# Patient Record
Sex: FEMALE | Race: WHITE
[De-identification: names, ages, dates, MRNs, and addresses within clinical notes are randomized per-mention and may not be internally consistent; named-entity substitution may affect disease eponyms.]

---

## 2017-02-27 ENCOUNTER — APPOINTMENT (OUTPATIENT)
Dept: PHARMACY | Facility: CLINIC | Age: 67
End: 2017-02-27

## 2017-05-15 ENCOUNTER — APPOINTMENT (OUTPATIENT)
Dept: OTOLARYNGOLOGY | Facility: CLINIC | Age: 67
End: 2017-05-15

## 2017-05-15 VITALS
DIASTOLIC BLOOD PRESSURE: 88 MMHG | SYSTOLIC BLOOD PRESSURE: 169 MMHG | BODY MASS INDEX: 28.32 KG/M2 | WEIGHT: 150 LBS | HEART RATE: 74 BPM | HEIGHT: 61 IN

## 2017-05-23 ENCOUNTER — APPOINTMENT (OUTPATIENT)
Dept: PHARMACY | Facility: CLINIC | Age: 67
End: 2017-05-23

## 2017-06-29 ENCOUNTER — APPOINTMENT (OUTPATIENT)
Dept: PHARMACY | Facility: CLINIC | Age: 67
End: 2017-06-29

## 2017-08-24 ENCOUNTER — APPOINTMENT (OUTPATIENT)
Dept: PHARMACY | Facility: CLINIC | Age: 67
End: 2017-08-24
Payer: SELF-PAY

## 2017-08-24 PROCEDURE — V5264D: CUSTOM | Mod: LT

## 2017-09-08 ENCOUNTER — APPOINTMENT (OUTPATIENT)
Dept: PHARMACY | Facility: CLINIC | Age: 67
End: 2017-09-08
Payer: MEDICARE

## 2017-09-08 PROCEDURE — V5299A: CUSTOM | Mod: NC

## 2017-12-26 ENCOUNTER — MEDICATION RENEWAL (OUTPATIENT)
Age: 67
End: 2017-12-26

## 2017-12-26 ENCOUNTER — APPOINTMENT (OUTPATIENT)
Dept: PHARMACY | Facility: CLINIC | Age: 67
End: 2017-12-26
Payer: SELF-PAY

## 2017-12-26 PROCEDURE — V5299A: CUSTOM | Mod: NC

## 2018-01-02 ENCOUNTER — APPOINTMENT (OUTPATIENT)
Dept: PHARMACY | Facility: CLINIC | Age: 68
End: 2018-01-02
Payer: SELF-PAY

## 2018-01-02 PROCEDURE — V5267D: CUSTOM

## 2018-01-02 PROCEDURE — V5014C: CUSTOM

## 2018-04-09 ENCOUNTER — APPOINTMENT (OUTPATIENT)
Dept: PHARMACY | Facility: CLINIC | Age: 68
End: 2018-04-09
Payer: SELF-PAY

## 2018-04-09 PROCEDURE — V5014B: CUSTOM

## 2018-05-07 ENCOUNTER — APPOINTMENT (OUTPATIENT)
Dept: OTOLARYNGOLOGY | Facility: CLINIC | Age: 68
End: 2018-05-07
Payer: MEDICARE

## 2018-05-07 VITALS
BODY MASS INDEX: 28.32 KG/M2 | SYSTOLIC BLOOD PRESSURE: 165 MMHG | HEIGHT: 61 IN | HEART RATE: 81 BPM | DIASTOLIC BLOOD PRESSURE: 89 MMHG | WEIGHT: 150 LBS

## 2018-05-07 PROCEDURE — 99213 OFFICE O/P EST LOW 20 MIN: CPT

## 2018-05-07 PROCEDURE — 92567 TYMPANOMETRY: CPT

## 2018-05-07 PROCEDURE — 92557 COMPREHENSIVE HEARING TEST: CPT

## 2018-05-15 ENCOUNTER — APPOINTMENT (OUTPATIENT)
Dept: PHARMACY | Facility: CLINIC | Age: 68
End: 2018-05-15
Payer: SELF-PAY

## 2018-05-15 PROCEDURE — V5014I: CUSTOM

## 2018-05-21 ENCOUNTER — APPOINTMENT (OUTPATIENT)
Dept: PHARMACY | Facility: CLINIC | Age: 68
End: 2018-05-21
Payer: SELF-PAY

## 2018-05-21 PROCEDURE — V5299A: CUSTOM | Mod: NC

## 2018-12-14 ENCOUNTER — APPOINTMENT (OUTPATIENT)
Dept: PHARMACY | Facility: CLINIC | Age: 68
End: 2018-12-14
Payer: SELF-PAY

## 2018-12-14 PROCEDURE — V5299A: CUSTOM | Mod: NC

## 2018-12-17 ENCOUNTER — APPOINTMENT (OUTPATIENT)
Dept: PHARMACY | Facility: CLINIC | Age: 68
End: 2018-12-17
Payer: SELF-PAY

## 2018-12-17 PROCEDURE — V5264D: CUSTOM | Mod: NC

## 2018-12-26 ENCOUNTER — APPOINTMENT (OUTPATIENT)
Dept: OTOLARYNGOLOGY | Facility: CLINIC | Age: 68
End: 2018-12-26
Payer: MEDICARE

## 2018-12-26 VITALS
WEIGHT: 150 LBS | HEIGHT: 61 IN | DIASTOLIC BLOOD PRESSURE: 92 MMHG | BODY MASS INDEX: 28.32 KG/M2 | SYSTOLIC BLOOD PRESSURE: 183 MMHG

## 2018-12-26 PROCEDURE — 69210 REMOVE IMPACTED EAR WAX UNI: CPT

## 2018-12-26 PROCEDURE — 99213 OFFICE O/P EST LOW 20 MIN: CPT | Mod: 25

## 2019-06-17 ENCOUNTER — APPOINTMENT (OUTPATIENT)
Dept: PHARMACY | Facility: CLINIC | Age: 69
End: 2019-06-17
Payer: SELF-PAY

## 2019-06-17 ENCOUNTER — APPOINTMENT (OUTPATIENT)
Dept: OTOLARYNGOLOGY | Facility: CLINIC | Age: 69
End: 2019-06-17
Payer: MEDICARE

## 2019-06-17 VITALS
SYSTOLIC BLOOD PRESSURE: 167 MMHG | HEART RATE: 74 BPM | WEIGHT: 155 LBS | HEIGHT: 61 IN | DIASTOLIC BLOOD PRESSURE: 91 MMHG | BODY MASS INDEX: 29.27 KG/M2

## 2019-06-17 PROCEDURE — V5014I: CUSTOM

## 2019-06-17 PROCEDURE — 99213 OFFICE O/P EST LOW 20 MIN: CPT | Mod: 25

## 2019-06-17 PROCEDURE — 69210 REMOVE IMPACTED EAR WAX UNI: CPT

## 2019-06-17 PROCEDURE — 92557 COMPREHENSIVE HEARING TEST: CPT

## 2019-06-17 PROCEDURE — 92567 TYMPANOMETRY: CPT

## 2019-06-17 RX ORDER — CETIRIZINE HCL 10 MG
TABLET ORAL
Refills: 0 | Status: ACTIVE | COMMUNITY

## 2019-06-17 NOTE — PHYSICAL EXAM
[Midline] : trachea located in midline position [Normal] : no nystagmus [de-identified] : copious wax removed AU - left slitlike perf with larger monomeric area & deep neomembrane , right TM perf healed. wax removed with alligator AU

## 2019-06-17 NOTE — REASON FOR VISIT
[Subsequent Evaluation] : a subsequent evaluation for [Other: _____] : [unfilled] [FreeTextEntry2] : follow up bilateral hearing loss

## 2019-06-17 NOTE — HISTORY OF PRESENT ILLNESS
[de-identified] : 69 year old female follow up bilateral hearing loss.  Currently wearing bilateral hearing aids.  States even with hearing aids, still not hearing well.  STates not sure if its the hearing aids, or her ears.

## 2019-07-15 ENCOUNTER — APPOINTMENT (OUTPATIENT)
Dept: PHARMACY | Facility: CLINIC | Age: 69
End: 2019-07-15
Payer: SELF-PAY

## 2019-07-15 PROCEDURE — V5010 ASSESSMENT FOR HEARING AID: CPT

## 2019-07-25 ENCOUNTER — APPOINTMENT (OUTPATIENT)
Dept: PHARMACY | Facility: CLINIC | Age: 69
End: 2019-07-25
Payer: SELF-PAY

## 2019-07-25 PROCEDURE — V5261C: CUSTOM

## 2019-08-26 ENCOUNTER — APPOINTMENT (OUTPATIENT)
Dept: PHARMACY | Facility: CLINIC | Age: 69
End: 2019-08-26
Payer: SELF-PAY

## 2019-08-26 PROCEDURE — V5299A: CUSTOM | Mod: NC

## 2019-09-04 ENCOUNTER — APPOINTMENT (OUTPATIENT)
Dept: PHARMACY | Facility: CLINIC | Age: 69
End: 2019-09-04
Payer: SELF-PAY

## 2019-09-04 PROCEDURE — V5299A: CUSTOM | Mod: NC

## 2019-11-15 ENCOUNTER — APPOINTMENT (OUTPATIENT)
Dept: PHARMACY | Facility: CLINIC | Age: 69
End: 2019-11-15
Payer: SELF-PAY

## 2019-11-15 PROCEDURE — V5299A: CUSTOM | Mod: NC

## 2019-11-25 ENCOUNTER — APPOINTMENT (OUTPATIENT)
Dept: PHARMACY | Facility: CLINIC | Age: 69
End: 2019-11-25
Payer: SELF-PAY

## 2019-11-25 PROCEDURE — V5299A: CUSTOM | Mod: NC

## 2020-01-06 ENCOUNTER — APPOINTMENT (OUTPATIENT)
Dept: PHARMACY | Facility: CLINIC | Age: 70
End: 2020-01-06
Payer: SELF-PAY

## 2020-01-06 ENCOUNTER — APPOINTMENT (OUTPATIENT)
Dept: PHARMACY | Facility: CLINIC | Age: 70
End: 2020-01-06

## 2020-01-06 ENCOUNTER — TRANSCRIPTION ENCOUNTER (OUTPATIENT)
Age: 70
End: 2020-01-06

## 2020-01-06 PROCEDURE — V5299A: CUSTOM | Mod: NC

## 2020-04-01 ENCOUNTER — APPOINTMENT (OUTPATIENT)
Dept: OTOLARYNGOLOGY | Facility: CLINIC | Age: 70
End: 2020-04-01

## 2020-08-24 ENCOUNTER — APPOINTMENT (OUTPATIENT)
Dept: PHARMACY | Facility: CLINIC | Age: 70
End: 2020-08-24
Payer: SELF-PAY

## 2020-08-24 ENCOUNTER — APPOINTMENT (OUTPATIENT)
Dept: OTOLARYNGOLOGY | Facility: CLINIC | Age: 70
End: 2020-08-24
Payer: MEDICARE

## 2020-08-24 VITALS
HEIGHT: 61 IN | DIASTOLIC BLOOD PRESSURE: 101 MMHG | SYSTOLIC BLOOD PRESSURE: 159 MMHG | WEIGHT: 160 LBS | BODY MASS INDEX: 30.21 KG/M2 | HEART RATE: 81 BPM

## 2020-08-24 PROCEDURE — 92557 COMPREHENSIVE HEARING TEST: CPT

## 2020-08-24 PROCEDURE — 99213 OFFICE O/P EST LOW 20 MIN: CPT | Mod: 25

## 2020-08-24 PROCEDURE — 69210 REMOVE IMPACTED EAR WAX UNI: CPT

## 2020-08-24 PROCEDURE — 92567 TYMPANOMETRY: CPT

## 2020-08-24 PROCEDURE — V5267D: CUSTOM

## 2020-08-30 NOTE — HISTORY OF PRESENT ILLNESS
[de-identified] : 70 year old female follow up bilateral hearing loss. Currently wearing bilateral hearing aids.  Unsure if hearing has changed from last year or not.  Patient denies otalgia, otorrhea, ear infections, tinnitus, dizziness, vertigo, headaches related to hearing.\par

## 2020-08-30 NOTE — PHYSICAL EXAM
[de-identified] : copious wax removed AU - left slitlike perf with larger monomeric area & deep neomembrane , right TM perf healed. wax removed with alligator AU

## 2020-09-15 ENCOUNTER — APPOINTMENT (OUTPATIENT)
Dept: PHARMACY | Facility: CLINIC | Age: 70
End: 2020-09-15
Payer: SELF-PAY

## 2020-09-15 PROCEDURE — V5299A: CUSTOM | Mod: NC

## 2020-10-19 ENCOUNTER — APPOINTMENT (OUTPATIENT)
Dept: PHARMACY | Facility: CLINIC | Age: 70
End: 2020-10-19
Payer: SELF-PAY

## 2020-10-19 PROCEDURE — V5299A: CUSTOM | Mod: NC

## 2020-11-02 ENCOUNTER — APPOINTMENT (OUTPATIENT)
Dept: PHARMACY | Facility: CLINIC | Age: 70
End: 2020-11-02
Payer: SELF-PAY

## 2020-11-02 PROCEDURE — V5299A: CUSTOM | Mod: NC

## 2021-03-01 ENCOUNTER — APPOINTMENT (OUTPATIENT)
Dept: OTOLARYNGOLOGY | Facility: CLINIC | Age: 71
End: 2021-03-01
Payer: MEDICARE

## 2021-03-01 PROCEDURE — 99212 OFFICE O/P EST SF 10 MIN: CPT | Mod: 25

## 2021-03-01 PROCEDURE — 69210 REMOVE IMPACTED EAR WAX UNI: CPT

## 2021-03-01 RX ORDER — SULFAMETHOXAZOLE AND TRIMETHOPRIM 800; 160 MG/1; MG/1
800-160 TABLET ORAL
Qty: 20 | Refills: 0 | Status: DISCONTINUED | COMMUNITY
Start: 2020-09-12

## 2021-03-01 RX ORDER — FLUOROURACIL 50 MG/G
5 CREAM TOPICAL
Qty: 40 | Refills: 0 | Status: DISCONTINUED | COMMUNITY
Start: 2020-12-09

## 2021-03-01 RX ORDER — FLUCONAZOLE 150 MG/1
150 TABLET ORAL
Qty: 1 | Refills: 0 | Status: DISCONTINUED | COMMUNITY
Start: 2020-09-12

## 2021-03-01 NOTE — HISTORY OF PRESENT ILLNESS
[de-identified] : 71F f/u for mixed hearing loss and left TM perforation - using Hearing Aids consistently- needs wax removed- denies otalgia, otorrhea, ear infections- hearing feels stable.

## 2021-03-01 NOTE — PHYSICAL EXAM
[de-identified] : Obstructing cerumen removed AU using alligator, - tolerated well - TMs normal AD post-removal, AS unchanged dry perf.

## 2021-06-07 ENCOUNTER — APPOINTMENT (OUTPATIENT)
Dept: PHARMACY | Facility: CLINIC | Age: 71
End: 2021-06-07
Payer: SELF-PAY

## 2021-06-07 PROCEDURE — V5267D: CUSTOM

## 2021-06-07 PROCEDURE — V5299A: CUSTOM | Mod: NC

## 2021-06-21 ENCOUNTER — APPOINTMENT (OUTPATIENT)
Dept: PHARMACY | Facility: CLINIC | Age: 71
End: 2021-06-21
Payer: SELF-PAY

## 2021-06-21 PROCEDURE — V5299A: CUSTOM | Mod: NC

## 2021-07-09 ENCOUNTER — APPOINTMENT (OUTPATIENT)
Dept: PHARMACY | Facility: CLINIC | Age: 71
End: 2021-07-09
Payer: SELF-PAY

## 2021-07-09 PROCEDURE — V5299A: CUSTOM | Mod: NC

## 2021-09-13 ENCOUNTER — APPOINTMENT (OUTPATIENT)
Dept: OTOLARYNGOLOGY | Facility: CLINIC | Age: 71
End: 2021-09-13
Payer: MEDICARE

## 2021-09-13 VITALS
BODY MASS INDEX: 28.7 KG/M2 | WEIGHT: 152 LBS | DIASTOLIC BLOOD PRESSURE: 96 MMHG | HEART RATE: 90 BPM | SYSTOLIC BLOOD PRESSURE: 170 MMHG | HEIGHT: 61 IN

## 2021-09-13 DIAGNOSIS — I10 ESSENTIAL (PRIMARY) HYPERTENSION: ICD-10-CM

## 2021-09-13 PROCEDURE — 69210 REMOVE IMPACTED EAR WAX UNI: CPT

## 2021-09-13 PROCEDURE — 99214 OFFICE O/P EST MOD 30 MIN: CPT | Mod: 25

## 2021-09-13 PROCEDURE — 92557 COMPREHENSIVE HEARING TEST: CPT

## 2021-09-13 PROCEDURE — 92567 TYMPANOMETRY: CPT

## 2021-09-13 RX ORDER — ACETAMINOPHEN AND CODEINE PHOSPHATE 300; 15 MG/1; MG/1
300-15 TABLET ORAL
Qty: 10 | Refills: 0 | Status: DISCONTINUED | COMMUNITY
Start: 2021-06-23

## 2021-09-13 NOTE — PHYSICAL EXAM
[Midline] : trachea located in midline position [Normal] : no nystagmus [de-identified] : Obstructing cerumen removed AU using alligator, - tolerated well - TMs normal AU

## 2021-09-13 NOTE — DATA REVIEWED
[de-identified] : Ad: Moderate to severe SNHL. Type A tymp. Excellent WRS.\par As: Moderate to severe mixed HL- SNHL above 2kHz. TYpe B tymp, large ECV. Excellent WRS.

## 2022-03-14 ENCOUNTER — APPOINTMENT (OUTPATIENT)
Dept: OTOLARYNGOLOGY | Facility: CLINIC | Age: 72
End: 2022-03-14
Payer: MEDICARE

## 2022-03-14 VITALS
BODY MASS INDEX: 29.27 KG/M2 | DIASTOLIC BLOOD PRESSURE: 109 MMHG | WEIGHT: 155 LBS | SYSTOLIC BLOOD PRESSURE: 171 MMHG | HEIGHT: 61 IN | HEART RATE: 91 BPM

## 2022-03-14 PROCEDURE — 92567 TYMPANOMETRY: CPT

## 2022-03-14 PROCEDURE — 69210 REMOVE IMPACTED EAR WAX UNI: CPT

## 2022-03-14 PROCEDURE — 92557 COMPREHENSIVE HEARING TEST: CPT

## 2022-03-14 PROCEDURE — 99213 OFFICE O/P EST LOW 20 MIN: CPT | Mod: 25

## 2022-03-14 NOTE — PHYSICAL EXAM
[Midline] : trachea located in midline position [Normal] : no nystagmus [de-identified] : Obstructing cerumen removed AU using alligator, - tolerated well - TMs normal AU - no perforation

## 2022-03-14 NOTE — HISTORY OF PRESENT ILLNESS
[de-identified] : 73 yo F with asymmetric mixed hearing loss presents for follow up. Wears bilaterally hearing aids regularly. No tinnitus, otalgia, otorrhea, ear infections, dizziness or headaches. Did not get PCP.  feels hearing worse - hard time understanding what is ogonig on

## 2022-03-14 NOTE — DATA REVIEWED
[de-identified] : Moderate to moderately severe mixed HL.\par Type A Tymp, RE,a nd Type B Tymp with large ECV, LE.\par

## 2022-03-30 ENCOUNTER — APPOINTMENT (OUTPATIENT)
Dept: PHARMACY | Facility: CLINIC | Age: 72
End: 2022-03-30
Payer: SELF-PAY

## 2022-03-30 PROCEDURE — V5267D: CUSTOM

## 2022-05-24 ENCOUNTER — APPOINTMENT (OUTPATIENT)
Dept: PHARMACY | Facility: CLINIC | Age: 72
End: 2022-05-24
Payer: SELF-PAY

## 2022-05-24 PROCEDURE — V5264E: CUSTOM

## 2022-06-28 ENCOUNTER — APPOINTMENT (OUTPATIENT)
Dept: PHARMACY | Facility: CLINIC | Age: 72
End: 2022-06-28

## 2022-06-28 PROCEDURE — V5299A: CUSTOM | Mod: NC

## 2022-08-22 ENCOUNTER — APPOINTMENT (OUTPATIENT)
Dept: PHARMACY | Facility: CLINIC | Age: 72
End: 2022-08-22

## 2022-08-22 PROCEDURE — V5299A: CUSTOM | Mod: NC

## 2022-08-29 ENCOUNTER — APPOINTMENT (OUTPATIENT)
Dept: OTOLARYNGOLOGY | Facility: CLINIC | Age: 72
End: 2022-08-29

## 2022-08-29 ENCOUNTER — APPOINTMENT (OUTPATIENT)
Dept: PHARMACY | Facility: CLINIC | Age: 72
End: 2022-08-29

## 2022-08-29 PROCEDURE — G0268 REMOVAL OF IMPACTED WAX MD: CPT

## 2022-08-29 PROCEDURE — 99212 OFFICE O/P EST SF 10 MIN: CPT | Mod: 25

## 2022-08-29 PROCEDURE — V5299A: CUSTOM | Mod: NC

## 2022-08-30 ENCOUNTER — NON-APPOINTMENT (OUTPATIENT)
Age: 72
End: 2022-08-30

## 2022-09-12 ENCOUNTER — APPOINTMENT (OUTPATIENT)
Dept: PHARMACY | Facility: CLINIC | Age: 72
End: 2022-09-12

## 2022-09-12 PROCEDURE — V5299A: CUSTOM | Mod: NC

## 2022-09-23 NOTE — PHYSICAL EXAM
[Midline] : trachea located in midline position [Normal] : no nystagmus [de-identified] : Obstructing cerumen removed AU using alligator, - tolerated well - TMs normal AU - no perforation

## 2022-09-28 ENCOUNTER — APPOINTMENT (OUTPATIENT)
Dept: PHARMACY | Facility: CLINIC | Age: 72
End: 2022-09-28

## 2022-09-28 PROCEDURE — V5299A: CUSTOM | Mod: NC

## 2022-10-03 ENCOUNTER — APPOINTMENT (OUTPATIENT)
Dept: PHARMACY | Facility: CLINIC | Age: 72
End: 2022-10-03

## 2022-10-28 ENCOUNTER — NON-APPOINTMENT (OUTPATIENT)
Age: 72
End: 2022-10-28

## 2022-12-08 ENCOUNTER — APPOINTMENT (OUTPATIENT)
Dept: PHARMACY | Facility: CLINIC | Age: 72
End: 2022-12-08

## 2022-12-08 PROCEDURE — V5267D: CUSTOM

## 2022-12-08 NOTE — PROCEDURE
[de-identified] : Earmold dispensed. Patient reported that it had a good fit. SN [id number]. Cleaned and checked both hearing aids. Patient happy with services today. Purchased 3 packs of wax guards.

## 2022-12-08 NOTE — HISTORY OF PRESENT ILLNESS
[FreeTextEntry1] : 72 year old female, with bilateral sensorineural hearing loss.  Currently uses [employer]wave Evoke 330 Fusion hearing aids   [FreeTextEntry8] : Patient seen today for dispensing of remade right earmold.

## 2022-12-08 NOTE — PLAN
[FreeTextEntry2] : 1. Continued use of amplification \par 2. HAC in 6 months or sooner as needed\par 3. Annual audio or sooner as needed

## 2022-12-27 ENCOUNTER — NON-APPOINTMENT (OUTPATIENT)
Age: 72
End: 2022-12-27

## 2023-01-11 ENCOUNTER — APPOINTMENT (OUTPATIENT)
Dept: INFECTIOUS DISEASE | Facility: CLINIC | Age: 73
End: 2023-01-11
Payer: SELF-PAY

## 2023-01-11 DIAGNOSIS — Z71.84 ENC FOR HEALTH COUNSELING RELATED TO TRAVEL: ICD-10-CM

## 2023-01-11 PROCEDURE — 90691 TYPHOID VACCINE IM: CPT

## 2023-01-11 PROCEDURE — 90471 IMMUNIZATION ADMIN: CPT | Mod: NC

## 2023-01-11 PROCEDURE — 99401 PREV MED CNSL INDIV APPRX 15: CPT | Mod: 25

## 2023-01-20 ENCOUNTER — OFFICE (OUTPATIENT)
Dept: URBAN - METROPOLITAN AREA CLINIC 90 | Facility: CLINIC | Age: 73
Setting detail: OPHTHALMOLOGY
End: 2023-01-20
Payer: MEDICARE

## 2023-01-20 DIAGNOSIS — H43.391: ICD-10-CM

## 2023-01-20 DIAGNOSIS — H25.13: ICD-10-CM

## 2023-01-20 DIAGNOSIS — H57.02: ICD-10-CM

## 2023-01-20 DIAGNOSIS — H02.834: ICD-10-CM

## 2023-01-20 DIAGNOSIS — H16.223: ICD-10-CM

## 2023-01-20 DIAGNOSIS — H02.831: ICD-10-CM

## 2023-01-20 DIAGNOSIS — H35.432: ICD-10-CM

## 2023-01-20 DIAGNOSIS — H40.013: ICD-10-CM

## 2023-01-20 DIAGNOSIS — H47.021: ICD-10-CM

## 2023-01-20 DIAGNOSIS — H02.401: ICD-10-CM

## 2023-01-20 PROCEDURE — 92250 FUNDUS PHOTOGRAPHY W/I&R: CPT | Performed by: OPHTHALMOLOGY

## 2023-01-20 PROCEDURE — 92014 COMPRE OPH EXAM EST PT 1/>: CPT | Performed by: OPHTHALMOLOGY

## 2023-01-20 PROCEDURE — 92083 EXTENDED VISUAL FIELD XM: CPT | Performed by: OPHTHALMOLOGY

## 2023-01-20 ASSESSMENT — LID EXAM ASSESSMENTS
OD_MEIBOMITIS: RLL 1+
OS_BLEPHARITIS: 1+
OD_BLEPHARITIS: 1+

## 2023-01-20 ASSESSMENT — REFRACTION_MANIFEST
OD_AXIS: 070
OS_VA1: 20/30+2
OD_CYLINDER: -1.25
OD_CYLINDER: -0.50
OS_CYLINDER: -1.00
OS_ADD: +2.50
OD_ADD: +2.25
OS_VA1: 20/20
OD_SPHERE: -0.75
OS_VA2: 20/20(J1+)
OS_SPHERE: -0.75
OD_VA2: 20/20
OD_VA1: 20/30-1
OD_VA2: 20/20(J1+)
OS_AXIS: 070
OD_AXIS: 090
OS_SPHERE: -0.50
OD_VA1: 20/30
OS_VA2: 20/20
OS_ADD: +2.25
OS_SPHERE: -0.50
OD_SPHERE: -1.00
OD_VA1: 20/25+2
OS_CYLINDER: -1.00
OD_SPHERE: -0.50
OD_ADD: +2.50
OS_AXIS: 060
OS_CYLINDER: -0.75
OD_AXIS: 075
OS_VA1: 20/25+2
OD_CYLINDER: -1.25
OS_AXIS: 070

## 2023-01-20 ASSESSMENT — REFRACTION_CURRENTRX
OD_VPRISM_DIRECTION: SV
OS_AXIS: 075
OS_SPHERE: -0.50
OD_SPHERE: -0.75
OS_SPHERE: -2.00
OS_SPHERE: -0.50
OD_SPHERE: -0.75
OD_AXIS: 92
OS_CYLINDER: -1.00
OS_VPRISM_DIRECTION: SV
OS_CYLINDER: -0.75
OS_OVR_VA: 20/
OD_CYLINDER: -1.00
OS_VPRISM_DIRECTION: SV
OD_SPHERE: +2.00
OD_VPRISM_DIRECTION: SV
OD_CYLINDER: -1.00
OD_AXIS: 080
OD_AXIS: 093
OS_OVR_VA: 20/
OD_CYLINDER: -1.25
OD_VPRISM_DIRECTION: SV
OS_OVR_VA: 20/
OD_OVR_VA: 20/
OS_VPRISM_DIRECTION: SV
OS_CYLINDER: -0.75
OS_AXIS: 070
OS_AXIS: 63
OD_OVR_VA: 20/
OD_OVR_VA: 20/

## 2023-01-20 ASSESSMENT — SPHEQUIV_DERIVED
OD_SPHEQUIV: -1.125
OS_SPHEQUIV: -1
OS_SPHEQUIV: -1.125
OD_SPHEQUIV: -2.25
OS_SPHEQUIV: -1.25
OS_SPHEQUIV: -1
OD_SPHEQUIV: -1.25
OD_SPHEQUIV: -1.375

## 2023-01-20 ASSESSMENT — LID POSITION - COMMENTS
OD_COMMENTS: TEMPORAL HOODING
OS_COMMENTS: TEMPORAL HOODING

## 2023-01-20 ASSESSMENT — REFRACTION_AUTOREFRACTION
OD_CYLINDER: -0.50
OD_AXIS: 086
OS_CYLINDER: -1.00
OS_AXIS: 061
OS_SPHERE: -0.75
OD_SPHERE: -2.00

## 2023-01-20 ASSESSMENT — AXIALLENGTH_DERIVED
OS_AL: 23.5683
OD_AL: 23.2993
OS_AL: 23.6659
OS_AL: 23.5683
OD_AL: 23.3469
OD_AL: 23.3947
OD_AL: 23.7348
OS_AL: 23.617

## 2023-01-20 ASSESSMENT — CONFRONTATIONAL VISUAL FIELD TEST (CVF)
OD_FINDINGS: FULL
OS_FINDINGS: FULL

## 2023-01-20 ASSESSMENT — VISUAL ACUITY
OD_BCVA: 20/25+2
OS_BCVA: 20/30+2

## 2023-01-20 ASSESSMENT — TONOMETRY
OS_IOP_MMHG: 14
OD_IOP_MMHG: 14

## 2023-01-20 ASSESSMENT — LID POSITION - PTOSIS: OD_PTOSIS: RUL 1+

## 2023-01-20 ASSESSMENT — SUPERFICIAL PUNCTATE KERATITIS (SPK)
OD_SPK: ABSENT
OS_SPK: ABSENT

## 2023-01-20 ASSESSMENT — KERATOMETRY
OD_K1POWER_DIOPTERS: 45.25
OS_K1POWER_DIOPTERS: 44.25
OD_AXISANGLE_DEGREES: 134
OD_K2POWER_DIOPTERS: 45.75
METHOD_AUTO_MANUAL: AUTO
OS_AXISANGLE_DEGREES: 127
OS_K2POWER_DIOPTERS: 45.00

## 2023-01-20 ASSESSMENT — LID POSITION - DERMATOCHALASIS
OS_DERMATOCHALASIS: LUL 1+
OD_DERMATOCHALASIS: RUL 1+

## 2023-02-27 ENCOUNTER — APPOINTMENT (OUTPATIENT)
Dept: PHARMACY | Facility: CLINIC | Age: 73
End: 2023-02-27
Payer: SELF-PAY

## 2023-02-27 PROCEDURE — V5014D: CUSTOM | Mod: RT

## 2023-03-01 ENCOUNTER — APPOINTMENT (OUTPATIENT)
Dept: PHARMACY | Facility: CLINIC | Age: 73
End: 2023-03-01
Payer: SELF-PAY

## 2023-03-01 PROCEDURE — V5299A: CUSTOM | Mod: NC

## 2023-04-17 ENCOUNTER — APPOINTMENT (OUTPATIENT)
Dept: PHARMACY | Facility: CLINIC | Age: 73
End: 2023-04-17
Payer: SELF-PAY

## 2023-04-17 ENCOUNTER — APPOINTMENT (OUTPATIENT)
Dept: OTOLARYNGOLOGY | Facility: CLINIC | Age: 73
End: 2023-04-17
Payer: MEDICARE

## 2023-04-17 VITALS
BODY MASS INDEX: 30.21 KG/M2 | DIASTOLIC BLOOD PRESSURE: 96 MMHG | WEIGHT: 160 LBS | HEIGHT: 61 IN | HEART RATE: 86 BPM | SYSTOLIC BLOOD PRESSURE: 172 MMHG

## 2023-04-17 PROCEDURE — V5014I: CUSTOM

## 2023-04-17 PROCEDURE — 99213 OFFICE O/P EST LOW 20 MIN: CPT | Mod: 25

## 2023-04-17 PROCEDURE — 69210 REMOVE IMPACTED EAR WAX UNI: CPT

## 2023-05-11 ENCOUNTER — OFFICE (OUTPATIENT)
Dept: URBAN - METROPOLITAN AREA CLINIC 90 | Facility: CLINIC | Age: 73
Setting detail: OPHTHALMOLOGY
End: 2023-05-11
Payer: MEDICARE

## 2023-05-11 DIAGNOSIS — H02.834: ICD-10-CM

## 2023-05-11 DIAGNOSIS — H43.391: ICD-10-CM

## 2023-05-11 DIAGNOSIS — H25.13: ICD-10-CM

## 2023-05-11 DIAGNOSIS — H40.013: ICD-10-CM

## 2023-05-11 DIAGNOSIS — H57.02: ICD-10-CM

## 2023-05-11 DIAGNOSIS — H02.831: ICD-10-CM

## 2023-05-11 DIAGNOSIS — H02.401: ICD-10-CM

## 2023-05-11 DIAGNOSIS — H35.432: ICD-10-CM

## 2023-05-11 DIAGNOSIS — H16.223: ICD-10-CM

## 2023-05-11 PROCEDURE — 92014 COMPRE OPH EXAM EST PT 1/>: CPT | Performed by: OPHTHALMOLOGY

## 2023-05-11 ASSESSMENT — AXIALLENGTH_DERIVED
OS_AL: 23.5683
OD_AL: 23.2993
OD_AL: 23.3947
OS_AL: 23.617
OD_AL: 23.7348
OS_AL: 23.6659
OD_AL: 23.3469
OS_AL: 23.5683

## 2023-05-11 ASSESSMENT — REFRACTION_MANIFEST
OD_AXIS: 070
OD_ADD: +2.50
OS_VA1: 20/25+2
OS_CYLINDER: -1.00
OS_ADD: +2.50
OS_AXIS: 070
OD_CYLINDER: -1.25
OD_SPHERE: -1.00
OS_VA1: 20/20
OS_CYLINDER: -0.75
OD_SPHERE: -0.75
OS_AXIS: 070
OS_SPHERE: -0.50
OS_SPHERE: -0.75
OS_ADD: +2.25
OS_CYLINDER: -1.00
OD_VA1: 20/25+2
OS_VA2: 20/20
OD_VA1: 20/30
OD_ADD: +2.25
OD_VA1: 20/30-1
OS_VA1: 20/30+2
OS_VA2: 20/20(J1+)
OS_SPHERE: -0.50
OD_AXIS: 075
OS_AXIS: 060
OD_VA2: 20/20(J1+)
OD_SPHERE: -0.50
OD_VA2: 20/20
OD_AXIS: 090
OD_CYLINDER: -0.50
OD_CYLINDER: -1.25

## 2023-05-11 ASSESSMENT — REFRACTION_CURRENTRX
OS_OVR_VA: 20/
OD_VPRISM_DIRECTION: SV
OD_OVR_VA: 20/
OS_SPHERE: -2.00
OD_AXIS: 080
OS_OVR_VA: 20/
OS_VPRISM_DIRECTION: SV
OD_AXIS: 093
OD_SPHERE: -0.75
OS_OVR_VA: 20/
OD_CYLINDER: -1.25
OD_CYLINDER: -1.00
OD_VPRISM_DIRECTION: SV
OS_VPRISM_DIRECTION: SV
OS_SPHERE: -0.50
OS_AXIS: 63
OD_SPHERE: -0.75
OS_CYLINDER: -1.00
OS_VPRISM_DIRECTION: SV
OS_AXIS: 070
OS_AXIS: 075
OS_CYLINDER: -0.75
OD_OVR_VA: 20/
OD_OVR_VA: 20/
OD_CYLINDER: -1.00
OD_VPRISM_DIRECTION: SV
OD_AXIS: 92
OD_SPHERE: +2.00
OS_SPHERE: -0.50
OS_CYLINDER: -0.75

## 2023-05-11 ASSESSMENT — REFRACTION_AUTOREFRACTION
OD_CYLINDER: -0.50
OD_SPHERE: -2.00
OS_SPHERE: -0.75
OD_AXIS: 086
OS_CYLINDER: -1.00
OS_AXIS: 061

## 2023-05-11 ASSESSMENT — LID POSITION - COMMENTS
OD_COMMENTS: TEMPORAL HOODING
OS_COMMENTS: TEMPORAL HOODING

## 2023-05-11 ASSESSMENT — SPHEQUIV_DERIVED
OD_SPHEQUIV: -1.25
OS_SPHEQUIV: -1
OD_SPHEQUIV: -1.125
OD_SPHEQUIV: -2.25
OS_SPHEQUIV: -1
OS_SPHEQUIV: -1.25
OS_SPHEQUIV: -1.125
OD_SPHEQUIV: -1.375

## 2023-05-11 ASSESSMENT — LID POSITION - PTOSIS: OD_PTOSIS: RUL 1+

## 2023-05-11 ASSESSMENT — VISUAL ACUITY
OS_BCVA: 20/40+2
OD_BCVA: 20/30+2

## 2023-05-11 ASSESSMENT — SUPERFICIAL PUNCTATE KERATITIS (SPK)
OS_SPK: ABSENT
OD_SPK: ABSENT

## 2023-05-11 ASSESSMENT — CONFRONTATIONAL VISUAL FIELD TEST (CVF)
OS_FINDINGS: FULL
OD_FINDINGS: FULL

## 2023-05-11 ASSESSMENT — LID EXAM ASSESSMENTS
OD_BLEPHARITIS: 1+
OD_MEIBOMITIS: RLL 1+
OS_BLEPHARITIS: 1+

## 2023-05-11 ASSESSMENT — LID POSITION - DERMATOCHALASIS
OD_DERMATOCHALASIS: RUL 1+
OS_DERMATOCHALASIS: LUL 1+

## 2023-05-11 ASSESSMENT — KERATOMETRY
OD_K2POWER_DIOPTERS: 45.75
OS_K1POWER_DIOPTERS: 44.25
OD_K1POWER_DIOPTERS: 45.25
OS_K2POWER_DIOPTERS: 45.00
OS_AXISANGLE_DEGREES: 127
METHOD_AUTO_MANUAL: AUTO
OD_AXISANGLE_DEGREES: 134

## 2023-05-11 ASSESSMENT — TONOMETRY
OS_IOP_MMHG: 14
OD_IOP_MMHG: 14

## 2023-05-24 NOTE — PHYSICAL EXAM
[Normal] : normal appearance, well groomed, well nourished, and in no acute distress [de-identified] : Obstructing cerumen removed AS using curette and alligator, - tolerated well - TMs normal AU - no perforation

## 2023-05-24 NOTE — HISTORY OF PRESENT ILLNESS
[de-identified] : 74 yo F referred by audiologist for cerumen impaction in left ear. No noticeable change in hearing. No tinnitus, otalgia, otorrhea, ear infections, dizziness or headaches.

## 2023-09-12 RX ORDER — FLUTICASONE PROPIONATE 50 UG/1
50 SPRAY, METERED NASAL
Qty: 16 | Refills: 3 | Status: ACTIVE | COMMUNITY
Start: 2023-09-12 | End: 1900-01-01

## 2023-09-15 DIAGNOSIS — H92.10 OTORRHEA, UNSPECIFIED EAR: ICD-10-CM

## 2023-09-15 RX ORDER — CIPROFLOXACIN AND DEXAMETHASONE 3; 1 MG/ML; MG/ML
0.3-0.1 SUSPENSION/ DROPS AURICULAR (OTIC)
Qty: 1 | Refills: 1 | Status: ACTIVE | COMMUNITY
Start: 2023-09-15 | End: 1900-01-01

## 2023-09-20 ENCOUNTER — APPOINTMENT (OUTPATIENT)
Dept: OTOLARYNGOLOGY | Facility: CLINIC | Age: 73
End: 2023-09-20
Payer: MEDICARE

## 2023-09-20 VITALS
BODY MASS INDEX: 29.45 KG/M2 | DIASTOLIC BLOOD PRESSURE: 85 MMHG | HEIGHT: 61 IN | SYSTOLIC BLOOD PRESSURE: 145 MMHG | WEIGHT: 156 LBS | HEART RATE: 105 BPM

## 2023-09-20 DIAGNOSIS — H90.6 MIXED CONDUCTIVE AND SENSORINEURAL HEARING LOSS, BILATERAL: ICD-10-CM

## 2023-09-20 DIAGNOSIS — H69.80 OTHER SPECIFIED DISORDERS OF EUSTACHIAN TUBE, UNSPECIFIED EAR: ICD-10-CM

## 2023-09-20 DIAGNOSIS — H61.22 IMPACTED CERUMEN, LEFT EAR: ICD-10-CM

## 2023-09-20 PROCEDURE — 69210 REMOVE IMPACTED EAR WAX UNI: CPT

## 2023-09-20 PROCEDURE — 99213 OFFICE O/P EST LOW 20 MIN: CPT | Mod: 25

## 2023-09-20 PROCEDURE — 92567 TYMPANOMETRY: CPT

## 2023-09-20 PROCEDURE — 92557 COMPREHENSIVE HEARING TEST: CPT

## 2023-11-29 ENCOUNTER — APPOINTMENT (OUTPATIENT)
Dept: PHARMACY | Facility: CLINIC | Age: 73
End: 2023-11-29
Payer: SELF-PAY

## 2023-11-29 PROCEDURE — V5267D: CUSTOM

## 2023-11-29 PROCEDURE — V5299J: CUSTOM

## 2023-12-28 ENCOUNTER — OFFICE (OUTPATIENT)
Dept: URBAN - METROPOLITAN AREA CLINIC 90 | Facility: CLINIC | Age: 73
Setting detail: OPHTHALMOLOGY
End: 2023-12-28
Payer: MEDICARE

## 2023-12-28 DIAGNOSIS — H02.831: ICD-10-CM

## 2023-12-28 DIAGNOSIS — H02.834: ICD-10-CM

## 2023-12-28 DIAGNOSIS — H25.13: ICD-10-CM

## 2023-12-28 DIAGNOSIS — H43.813: ICD-10-CM

## 2023-12-28 DIAGNOSIS — H57.02: ICD-10-CM

## 2023-12-28 DIAGNOSIS — H16.223: ICD-10-CM

## 2023-12-28 DIAGNOSIS — H43.391: ICD-10-CM

## 2023-12-28 DIAGNOSIS — H02.401: ICD-10-CM

## 2023-12-28 DIAGNOSIS — H40.013: ICD-10-CM

## 2023-12-28 DIAGNOSIS — H35.432: ICD-10-CM

## 2023-12-28 DIAGNOSIS — H35.412: ICD-10-CM

## 2023-12-28 PROCEDURE — 92250 FUNDUS PHOTOGRAPHY W/I&R: CPT | Performed by: OPHTHALMOLOGY

## 2023-12-28 PROCEDURE — 92083 EXTENDED VISUAL FIELD XM: CPT | Performed by: OPHTHALMOLOGY

## 2023-12-28 PROCEDURE — 92014 COMPRE OPH EXAM EST PT 1/>: CPT | Performed by: OPHTHALMOLOGY

## 2023-12-28 ASSESSMENT — SPHEQUIV_DERIVED
OS_SPHEQUIV: -1.125
OD_SPHEQUIV: -1.125
OD_SPHEQUIV: -2.875
OS_SPHEQUIV: -1.25
OS_SPHEQUIV: -1
OD_SPHEQUIV: -1.25
OD_SPHEQUIV: -1.375
OS_SPHEQUIV: -1

## 2023-12-28 ASSESSMENT — LID EXAM ASSESSMENTS
OS_BLEPHARITIS: 1+
OD_BLEPHARITIS: 1+
OD_MEIBOMITIS: RLL 1+

## 2023-12-28 ASSESSMENT — REFRACTION_AUTOREFRACTION
OD_SPHERE: -2.75
OS_SPHERE: -0.50
OS_CYLINDER: -1.50
OD_AXIS: 114
OD_CYLINDER: -0.25
OS_AXIS: 071

## 2023-12-28 ASSESSMENT — REFRACTION_CURRENTRX
OS_VPRISM_DIRECTION: SV
OS_CYLINDER: -0.75
OS_OVR_VA: 20/
OD_SPHERE: +2.00
OD_OVR_VA: 20/
OS_OVR_VA: 20/
OS_CYLINDER: -0.75
OS_SPHERE: -2.00
OS_OVR_VA: 20/
OS_VPRISM_DIRECTION: SV
OS_SPHERE: -0.50
OD_SPHERE: -0.75
OD_OVR_VA: 20/
OD_CYLINDER: -1.00
OD_CYLINDER: -1.25
OD_CYLINDER: -1.00
OS_SPHERE: -0.50
OD_VPRISM_DIRECTION: SV
OS_AXIS: 63
OD_AXIS: 92
OD_VPRISM_DIRECTION: SV
OS_VPRISM_DIRECTION: SV
OD_VPRISM_DIRECTION: SV
OS_CYLINDER: -1.00
OD_AXIS: 080
OD_AXIS: 093
OS_AXIS: 070
OD_SPHERE: -0.75
OD_OVR_VA: 20/
OS_AXIS: 075

## 2023-12-28 ASSESSMENT — REFRACTION_MANIFEST
OS_CYLINDER: -1.00
OD_AXIS: 070
OS_AXIS: 070
OD_VA1: 20/30-1
OS_VA2: 20/20(J1+)
OD_VA1: 20/25+2
OD_AXIS: 075
OS_ADD: +2.50
OD_SPHERE: -0.50
OD_CYLINDER: -1.25
OS_SPHERE: -0.50
OD_VA2: 20/20(J1+)
OS_SPHERE: -0.50
OS_ADD: +2.25
OD_ADD: +2.50
OD_ADD: +2.25
OS_VA1: 20/25+2
OD_SPHERE: -0.75
OS_SPHERE: -0.75
OD_SPHERE: -1.00
OS_AXIS: 070
OS_AXIS: 060
OS_VA2: 20/20
OD_AXIS: 090
OD_CYLINDER: -1.25
OS_VA1: 20/30+2
OS_VA1: 20/20
OS_CYLINDER: -0.75
OS_CYLINDER: -1.00
OD_VA1: 20/30
OD_VA2: 20/20
OD_CYLINDER: -0.50

## 2023-12-28 ASSESSMENT — LID POSITION - PTOSIS: OD_PTOSIS: RUL 1+

## 2023-12-28 ASSESSMENT — LID POSITION - COMMENTS
OS_COMMENTS: TEMPORAL HOODING
OD_COMMENTS: TEMPORAL HOODING

## 2023-12-28 ASSESSMENT — CONFRONTATIONAL VISUAL FIELD TEST (CVF)
OS_FINDINGS: FULL
OD_FINDINGS: FULL

## 2023-12-28 ASSESSMENT — SUPERFICIAL PUNCTATE KERATITIS (SPK)
OD_SPK: ABSENT
OS_SPK: ABSENT

## 2023-12-28 ASSESSMENT — LID POSITION - DERMATOCHALASIS
OS_DERMATOCHALASIS: LUL 1+
OD_DERMATOCHALASIS: RUL 1+

## 2024-04-30 ENCOUNTER — APPOINTMENT (OUTPATIENT)
Dept: PHARMACY | Facility: CLINIC | Age: 74
End: 2024-04-30
Payer: SELF-PAY

## 2024-04-30 PROCEDURE — V5299J: CUSTOM

## 2024-05-24 ENCOUNTER — APPOINTMENT (OUTPATIENT)
Dept: OTOLARYNGOLOGY | Facility: CLINIC | Age: 74
End: 2024-05-24
Payer: MEDICARE

## 2024-05-24 VITALS
WEIGHT: 156 LBS | BODY MASS INDEX: 29.45 KG/M2 | HEIGHT: 61 IN | DIASTOLIC BLOOD PRESSURE: 87 MMHG | SYSTOLIC BLOOD PRESSURE: 152 MMHG

## 2024-05-24 DIAGNOSIS — H90.3 SENSORINEURAL HEARING LOSS, BILATERAL: ICD-10-CM

## 2024-05-24 DIAGNOSIS — H93.293 OTHER ABNORMAL AUDITORY PERCEPTIONS, BILATERAL: ICD-10-CM

## 2024-05-24 DIAGNOSIS — H61.23 IMPACTED CERUMEN, BILATERAL: ICD-10-CM

## 2024-05-24 DIAGNOSIS — H72.2X2 OTHER MARGINAL PERFORATIONS OF TYMPANIC MEMBRANE, LEFT EAR: ICD-10-CM

## 2024-05-24 PROCEDURE — 99213 OFFICE O/P EST LOW 20 MIN: CPT | Mod: 25

## 2024-05-24 PROCEDURE — 69210 REMOVE IMPACTED EAR WAX UNI: CPT

## 2024-05-24 RX ORDER — LOSARTAN POTASSIUM 100 MG/1
TABLET, FILM COATED ORAL
Refills: 0 | Status: ACTIVE | COMMUNITY

## 2024-05-24 RX ORDER — AMOXICILLIN AND CLAVULANATE POTASSIUM 875; 125 MG/1; MG/1
875-125 TABLET, COATED ORAL
Qty: 20 | Refills: 0 | Status: COMPLETED | COMMUNITY
Start: 2023-09-15 | End: 2024-05-24

## 2024-05-24 RX ORDER — AZITHROMYCIN 500 MG/1
500 TABLET, FILM COATED ORAL
Qty: 3 | Refills: 0 | Status: COMPLETED | COMMUNITY
Start: 2023-01-11 | End: 2024-05-24

## 2024-05-24 RX ORDER — CHOLECALCIFEROL (VITAMIN D3) 25 MCG
TABLET ORAL
Refills: 0 | Status: ACTIVE | COMMUNITY

## 2024-05-24 RX ORDER — ATOVAQUONE AND PROGUANIL HYDROCHLORIDE 250; 100 MG/1; MG/1
250-100 TABLET, FILM COATED ORAL DAILY
Qty: 24 | Refills: 0 | Status: DISCONTINUED | COMMUNITY
Start: 2023-01-11 | End: 2024-05-24

## 2024-05-24 RX ORDER — ATORVASTATIN CALCIUM 80 MG/1
TABLET, FILM COATED ORAL
Refills: 0 | Status: ACTIVE | COMMUNITY

## 2024-05-24 NOTE — PHYSICAL EXAM
[Midline] : trachea located in midline position [Normal] : the left mastoid was normal [FreeTextEntry8] : cerumen impaction. removed [FreeTextEntry9] : cerumen impaction. removed [de-identified] : Marginal tympanic perforation

## 2024-05-24 NOTE — HISTORY OF PRESENT ILLNESS
[de-identified] : 74 year old female presents for ear cleaning.  Patient wears bilateral hearing aids. Went for hearing aid check and was told she has cerumen impaction in both ears. Denies otalgia, otorrhea, ear infections, tinnitus, dizziness, vertigo, headaches related to hearing.

## 2024-05-24 NOTE — ASSESSMENT
[FreeTextEntry1] : 74 year F present with bilateral SNHL, bilateral cerumen impaction, Left TM perforation. Patient tolerated cerumen removal without complaints.   Personally reviewed 9/20/23 Audiogram shows AU Moderate to Severe SNHL 250-8k hz. AD Tymp A. AS Tymp B. WRS AD 40% AS 72%  Physical exam shows Left marginal tympanic perforation  Recommend SNHL -Discussed Benefit of Hearings Aids and their value of helping keep brain stimulated by helping hear conversation which keeps a person active and socially connected. Stressed also the association with a lower risk of incident dementia and slower cognitive decline. -Clearance Hearing Aid Evaluation Given  Cerumen Impaction -Discussed not using q-tips or instruments to remove wax -If wax builds up can try debrox. Once it gets impacted recommend return to get it cleaned out.  -Return to clinic annually or sooner if new/worsen symptoms present

## 2024-05-29 ENCOUNTER — APPOINTMENT (OUTPATIENT)
Dept: PHARMACY | Facility: CLINIC | Age: 74
End: 2024-05-29
Payer: SELF-PAY

## 2024-05-29 PROCEDURE — V5010 ASSESSMENT FOR HEARING AID: CPT | Mod: NC

## 2024-06-19 ENCOUNTER — OFFICE (OUTPATIENT)
Dept: URBAN - METROPOLITAN AREA CLINIC 90 | Facility: CLINIC | Age: 74
Setting detail: OPHTHALMOLOGY
End: 2024-06-19
Payer: MEDICARE

## 2024-06-19 DIAGNOSIS — H02.401: ICD-10-CM

## 2024-06-19 DIAGNOSIS — H43.813: ICD-10-CM

## 2024-06-19 DIAGNOSIS — H02.831: ICD-10-CM

## 2024-06-19 DIAGNOSIS — H25.13: ICD-10-CM

## 2024-06-19 DIAGNOSIS — H02.834: ICD-10-CM

## 2024-06-19 DIAGNOSIS — H43.393: ICD-10-CM

## 2024-06-19 DIAGNOSIS — H57.02: ICD-10-CM

## 2024-06-19 DIAGNOSIS — H35.412: ICD-10-CM

## 2024-06-19 DIAGNOSIS — H16.223: ICD-10-CM

## 2024-06-19 DIAGNOSIS — H35.432: ICD-10-CM

## 2024-06-19 DIAGNOSIS — H40.013: ICD-10-CM

## 2024-06-19 PROCEDURE — 92012 INTRM OPH EXAM EST PATIENT: CPT | Performed by: OPHTHALMOLOGY

## 2024-06-19 PROCEDURE — 92133 CPTRZD OPH DX IMG PST SGM ON: CPT | Performed by: OPHTHALMOLOGY

## 2024-06-19 ASSESSMENT — LID POSITION - DERMATOCHALASIS
OS_DERMATOCHALASIS: LUL 1+
OD_DERMATOCHALASIS: RUL 1+

## 2024-06-19 ASSESSMENT — LID POSITION - COMMENTS
OS_COMMENTS: TEMPORAL HOODING
OD_COMMENTS: TEMPORAL HOODING

## 2024-06-19 ASSESSMENT — LID EXAM ASSESSMENTS
OS_BLEPHARITIS: 1+
OD_MEIBOMITIS: RLL 1+
OD_BLEPHARITIS: 1+

## 2024-06-19 ASSESSMENT — LID POSITION - PTOSIS: OD_PTOSIS: RUL 1+

## 2024-06-19 ASSESSMENT — CONFRONTATIONAL VISUAL FIELD TEST (CVF)
OD_FINDINGS: FULL
OS_FINDINGS: FULL

## 2024-09-25 ENCOUNTER — APPOINTMENT (OUTPATIENT)
Dept: OTOLARYNGOLOGY | Facility: CLINIC | Age: 74
End: 2024-09-25
Payer: MEDICARE

## 2024-09-25 VITALS
WEIGHT: 160 LBS | SYSTOLIC BLOOD PRESSURE: 146 MMHG | DIASTOLIC BLOOD PRESSURE: 88 MMHG | HEIGHT: 61 IN | HEART RATE: 86 BPM | BODY MASS INDEX: 30.21 KG/M2

## 2024-09-25 DIAGNOSIS — C44.90 UNSPECIFIED MALIGNANT NEOPLASM OF SKIN, UNSPECIFIED: ICD-10-CM

## 2024-09-25 DIAGNOSIS — H90.6 MIXED CONDUCTIVE AND SENSORINEURAL HEARING LOSS, BILATERAL: ICD-10-CM

## 2024-09-25 PROCEDURE — 92557 COMPREHENSIVE HEARING TEST: CPT

## 2024-09-25 PROCEDURE — 99213 OFFICE O/P EST LOW 20 MIN: CPT

## 2024-09-25 PROCEDURE — 92567 TYMPANOMETRY: CPT

## 2024-09-25 RX ORDER — LOSARTAN POTASSIUM AND HYDROCHLOROTHIAZIDE 12.5; 1 MG/1; MG/1
TABLET ORAL
Refills: 0 | Status: ACTIVE | COMMUNITY

## 2024-09-29 PROBLEM — C44.90 SKIN CANCER: Status: ACTIVE | Noted: 2024-09-25

## 2024-09-29 NOTE — PHYSICAL EXAM
[de-identified] : TMs intact AU [Midline] : trachea located in midline position [Normal] : no nystagmus

## 2024-09-29 NOTE — HISTORY OF PRESENT ILLNESS
[de-identified] :  74 yo F with hx of TM perforation AS and symmetric mixed loss presents for follow up. No noticeable change in hearing. Wears hearing aids regularly. Wants new hearing aids. Saw Dr. Olivares for cerumen removal. No tinnitus, otalgia, otorrhea, ear infections, dizziness/vertigo or headaches related to hearing

## 2024-09-29 NOTE — PHYSICAL EXAM
[de-identified] : TMs intact AU [Midline] : trachea located in midline position [Normal] : no nystagmus

## 2024-09-29 NOTE — HISTORY OF PRESENT ILLNESS
[de-identified] :  74 yo F with hx of TM perforation AS and symmetric mixed loss presents for follow up. No noticeable change in hearing. Wears hearing aids regularly. Wants new hearing aids. Saw Dr. Olivares for cerumen removal. No tinnitus, otalgia, otorrhea, ear infections, dizziness/vertigo or headaches related to hearing

## 2024-09-29 NOTE — DATA REVIEWED
[de-identified] : Right - mild to severe mixed hearing loss  Left - moderate to severe mixed hearing loss  Type B tympanograms AU consistent with conductive pathology

## 2024-09-29 NOTE — DATA REVIEWED
[de-identified] : Right - mild to severe mixed hearing loss  Left - moderate to severe mixed hearing loss  Type B tympanograms AU consistent with conductive pathology

## 2024-10-07 ENCOUNTER — APPOINTMENT (OUTPATIENT)
Dept: PHARMACY | Facility: CLINIC | Age: 74
End: 2024-10-07
Payer: SELF-PAY

## 2024-10-07 PROCEDURE — V5264E: CUSTOM | Mod: LT

## 2024-10-07 PROCEDURE — V5010 ASSESSMENT FOR HEARING AID: CPT | Mod: NC

## 2024-10-28 ENCOUNTER — OFFICE (OUTPATIENT)
Age: 74
Setting detail: OPHTHALMOLOGY
End: 2024-10-28
Payer: MEDICARE

## 2024-10-28 DIAGNOSIS — H57.11: ICD-10-CM

## 2024-10-28 PROBLEM — H01.00 BLEPHARITIS: Status: ACTIVE | Noted: 2024-10-28

## 2024-10-28 PROBLEM — H02.831 DERMATOCHALASIS; LEFT UPPER LID, RIGHT UPPER LID: Status: ACTIVE | Noted: 2024-10-28

## 2024-10-28 PROBLEM — H02.834 DERMATOCHALASIS; LEFT UPPER LID, RIGHT UPPER LID: Status: ACTIVE | Noted: 2024-10-28

## 2024-10-28 PROCEDURE — 92012 INTRM OPH EXAM EST PATIENT: CPT | Performed by: OPHTHALMOLOGY

## 2024-10-28 ASSESSMENT — KERATOMETRY
OS_K2POWER_DIOPTERS: 45.00
OS_AXISANGLE_DEGREES: 127
OS_K1POWER_DIOPTERS: 44.25
METHOD_AUTO_MANUAL: AUTO
OD_K2POWER_DIOPTERS: 45.75
OD_AXISANGLE_DEGREES: 134
OD_K1POWER_DIOPTERS: 45.25

## 2024-10-28 ASSESSMENT — REFRACTION_MANIFEST
OD_AXIS: 075
OS_VA2: 20/20(J1+)
OD_SPHERE: -2.75
OS_SPHERE: -0.50
OS_CYLINDER: -0.75
OS_VA1: 20/25
OD_CYLINDER: -1.25
OD_VA2: 20/20(J1+)
OD_SPHERE: -0.50
OD_CYLINDER: -1.25
OD_VA1: 20/30-1
OD_VA2: 20/30(J2)+
OS_SPHERE: -0.75
OS_SPHERE: -0.50
OS_VA1: 20/25+2
OS_ADD: +2.50
OD_VA1: 20/30
OS_CYLINDER: -0.75
OS_VA1: 20/20
OD_CYLINDER: -0.50
OS_SPHERE: -1.50
OS_AXIS: 060
OD_VA1: 20/25+2
OS_CYLINDER: -1.00
OS_VA2: 20/30(J2)+
OD_ADD: +2.50
OS_ADD: +2.50
OD_ADD: +2.25
OD_SPHERE: -1.00
OS_CYLINDER: -1.00
OS_AXIS: 070
OD_AXIS: 080
OD_CYLINDER: -0.50
OD_ADD: +2.50
OD_VA2: 20/20
OS_AXIS: 070
OS_VA1: 20/30+2
OD_SPHERE: -0.75
OS_AXIS: 040
OD_AXIS: 090
OD_VA1: 20/40+2
OD_AXIS: 070
OS_ADD: +2.25
OS_VA2: 20/20

## 2024-10-28 ASSESSMENT — REFRACTION_CURRENTRX
OD_AXIS: 92
OS_AXIS: 075
OD_CYLINDER: -1.25
OS_SPHERE: -0.50
OD_SPHERE: +2.00
OS_OVR_VA: 20/
OS_CYLINDER: -0.75
OD_OVR_VA: 20/
OS_CYLINDER: -1.00
OS_SPHERE: -2.00
OD_SPHERE: -0.75
OS_SPHERE: -0.50
OD_SPHERE: -0.75
OS_OVR_VA: 20/
OD_AXIS: 080
OD_OVR_VA: 20/
OD_VPRISM_DIRECTION: SV
OD_VPRISM_DIRECTION: SV
OS_OVR_VA: 20/
OD_CYLINDER: -1.00
OD_VPRISM_DIRECTION: SV
OS_AXIS: 63
OS_CYLINDER: -0.75
OD_AXIS: 093
OS_VPRISM_DIRECTION: SV
OS_AXIS: 070
OD_CYLINDER: -1.00
OS_VPRISM_DIRECTION: SV
OS_VPRISM_DIRECTION: SV
OD_OVR_VA: 20/

## 2024-10-28 ASSESSMENT — CONFRONTATIONAL VISUAL FIELD TEST (CVF)
OD_FINDINGS: FULL
OS_FINDINGS: FULL

## 2024-10-28 ASSESSMENT — LID POSITION - COMMENTS
OS_COMMENTS: TEMPORAL HOODING
OD_COMMENTS: TEMPORAL HOODING

## 2024-10-28 ASSESSMENT — LID POSITION - DERMATOCHALASIS
OD_DERMATOCHALASIS: RUL 1+
OS_DERMATOCHALASIS: LUL 1+

## 2024-10-28 ASSESSMENT — LID EXAM ASSESSMENTS
OD_BLEPHARITIS: 1+
OS_BLEPHARITIS: 1+
OD_MEIBOMITIS: RLL 1+

## 2024-10-28 ASSESSMENT — SUPERFICIAL PUNCTATE KERATITIS (SPK)
OD_SPK: ABSENT
OS_SPK: ABSENT

## 2024-10-28 ASSESSMENT — REFRACTION_AUTOREFRACTION
OS_AXIS: 047
OD_SPHERE: -2.75
OD_CYLINDER: -0.25
OS_CYLINDER: -0.75
OD_AXIS: 086
OS_SPHERE: -1.00

## 2024-10-28 ASSESSMENT — VISUAL ACUITY
OS_BCVA: 20/40
OD_BCVA: 20/25-1

## 2024-10-28 ASSESSMENT — LID POSITION - PTOSIS: OD_PTOSIS: RUL 1+

## 2024-11-01 ENCOUNTER — APPOINTMENT (OUTPATIENT)
Dept: PHARMACY | Facility: CLINIC | Age: 74
End: 2024-11-01
Payer: MEDICARE

## 2024-11-01 PROCEDURE — V5261C: CUSTOM | Mod: GY

## 2024-11-08 ENCOUNTER — APPOINTMENT (OUTPATIENT)
Dept: PHARMACY | Facility: CLINIC | Age: 74
End: 2024-11-08
Payer: SELF-PAY

## 2024-11-08 PROCEDURE — V5299A: CUSTOM

## 2024-11-11 ENCOUNTER — NON-APPOINTMENT (OUTPATIENT)
Age: 74
End: 2024-11-11

## 2024-12-09 ENCOUNTER — APPOINTMENT (OUTPATIENT)
Dept: PHARMACY | Facility: CLINIC | Age: 74
End: 2024-12-09
Payer: SELF-PAY

## 2024-12-09 PROCEDURE — V5299A: CUSTOM

## 2025-01-14 ENCOUNTER — OFFICE (OUTPATIENT)
Facility: LOCATION | Age: 75
Setting detail: OPHTHALMOLOGY
End: 2025-01-14
Payer: MEDICARE

## 2025-01-14 DIAGNOSIS — H25.13: ICD-10-CM

## 2025-01-14 DIAGNOSIS — H25.11: ICD-10-CM

## 2025-01-14 PROCEDURE — 92136 OPHTHALMIC BIOMETRY: CPT | Mod: TC | Performed by: OPHTHALMOLOGY

## 2025-01-14 PROCEDURE — 92136 OPHTHALMIC BIOMETRY: CPT | Mod: 26,RT | Performed by: OPHTHALMOLOGY

## 2025-01-14 ASSESSMENT — LID EXAM ASSESSMENTS
OS_BLEPHARITIS: LLL LUL 1+
OD_MEIBOMITIS: RLL 1+
OD_BLEPHARITIS: RLL RUL 1+

## 2025-01-14 ASSESSMENT — LID POSITION - PTOSIS: OD_PTOSIS: RUL 1+

## 2025-01-14 ASSESSMENT — LID POSITION - COMMENTS
OD_COMMENTS: TEMPORAL HOODING
OS_COMMENTS: TEMPORAL HOODING

## 2025-01-14 ASSESSMENT — CONFRONTATIONAL VISUAL FIELD TEST (CVF)
OD_FINDINGS: FULL
OS_FINDINGS: FULL

## 2025-01-14 ASSESSMENT — LID POSITION - DERMATOCHALASIS
OD_DERMATOCHALASIS: RUL 1+
OS_DERMATOCHALASIS: LUL 1+

## 2025-01-15 ENCOUNTER — RX ONLY (RX ONLY)
Age: 75
End: 2025-01-15

## 2025-01-15 ASSESSMENT — REFRACTION_MANIFEST
OS_VA2: 20/20
OD_CYLINDER: -0.50
OD_VA2: 20/20(J1+)
OS_VA2: 20/20(J1+)
OD_SPHERE: -0.75
OD_AXIS: 090
OS_VA1: 20/25
OD_SPHERE: -0.50
OS_AXIS: 040
OS_SPHERE: -1.50
OS_VA1: 20/30+2
OD_VA1: 20/25+2
OD_ADD: +2.50
OD_VA2: 20/20
OS_CYLINDER: -1.00
OD_AXIS: 080
OS_VA1: 20/25+2
OS_VA1: 20/20
OS_SPHERE: -0.75
OS_CYLINDER: -0.75
OS_AXIS: 070
OD_ADD: +2.50
OS_ADD: +2.50
OS_VA2: 20/30(J2)+
OD_VA1: 20/40+2
OD_SPHERE: -1.00
OD_VA1: 20/30
OS_SPHERE: -0.50
OD_AXIS: 070
OD_CYLINDER: -1.25
OS_AXIS: 070
OD_CYLINDER: -0.50
OS_ADD: +2.25
OD_CYLINDER: -1.25
OD_VA1: 20/30-1
OS_SPHERE: -0.50
OS_CYLINDER: -0.75
OD_VA2: 20/30(J2)+
OS_AXIS: 060
OD_ADD: +2.25
OS_ADD: +2.50
OS_CYLINDER: -1.00
OD_AXIS: 075
OD_SPHERE: -2.75

## 2025-01-15 ASSESSMENT — VISUAL ACUITY
OD_BCVA: 20/25-1
OS_BCVA: 20/80-1

## 2025-01-15 ASSESSMENT — REFRACTION_CURRENTRX
OD_VPRISM_DIRECTION: SV
OS_VPRISM_DIRECTION: SV
OD_SPHERE: -0.75
OD_VPRISM_DIRECTION: SV
OS_CYLINDER: -1.00
OD_VPRISM_DIRECTION: SV
OD_AXIS: 92
OD_SPHERE: -0.75
OS_OVR_VA: 20/
OD_CYLINDER: -1.00
OS_SPHERE: -0.50
OD_CYLINDER: -1.00
OS_AXIS: 63
OS_SPHERE: -0.50
OD_CYLINDER: -1.25
OD_CYLINDER: -1.00
OD_AXIS: 085
OS_AXIS: 075
OD_SPHERE: +2.00
OD_VPRISM_DIRECTION: SV
OS_SPHERE: -2.00
OS_AXIS: 065
OD_OVR_VA: 20/
OD_OVR_VA: 20/
OS_OVR_VA: 20/
OS_CYLINDER: -0.75
OD_OVR_VA: 20/
OS_VPRISM_DIRECTION: SV
OD_AXIS: 093
OD_SPHERE: -0.75
OS_SPHERE: -0.50
OS_AXIS: 070
OS_OVR_VA: 20/
OS_VPRISM_DIRECTION: SV
OS_CYLINDER: -0.75
OD_AXIS: 080
OS_VPRISM_DIRECTION: SV

## 2025-01-15 ASSESSMENT — REFRACTION_AUTOREFRACTION
OS_CYLINDER: -0.75
OS_AXIS: 058
OD_CYLINDER: -0.25
OD_AXIS: 078
OS_SPHERE: -1.00
OD_SPHERE: -3.00

## 2025-01-15 ASSESSMENT — KERATOMETRY
OS_AXISANGLE_DEGREES: 150
METHOD_AUTO_MANUAL: AUTO
OS_K1POWER_DIOPTERS: 44.25
OD_AXISANGLE_DEGREES: 147
OS_K2POWER_DIOPTERS: 45.00
OD_K1POWER_DIOPTERS: 45.00
OD_K2POWER_DIOPTERS: 45.50

## 2025-01-20 ENCOUNTER — AMBULATORY SURGERY CENTER (OUTPATIENT)
Dept: URBAN - METROPOLITAN AREA SURGERY 25 | Facility: SURGERY | Age: 75
Setting detail: OPHTHALMOLOGY
End: 2025-01-20
Payer: MEDICARE

## 2025-01-20 DIAGNOSIS — H25.11: ICD-10-CM

## 2025-01-20 PROCEDURE — 66984 XCAPSL CTRC RMVL W/O ECP: CPT | Mod: RT | Performed by: OPHTHALMOLOGY

## 2025-01-21 ENCOUNTER — OFFICE (OUTPATIENT)
Facility: LOCATION | Age: 75
Setting detail: OPHTHALMOLOGY
End: 2025-01-21
Payer: MEDICARE

## 2025-01-21 DIAGNOSIS — Z96.1: ICD-10-CM

## 2025-01-21 PROCEDURE — 99024 POSTOP FOLLOW-UP VISIT: CPT | Performed by: OPHTHALMOLOGY

## 2025-01-21 ASSESSMENT — KERATOMETRY
OS_K2POWER_DIOPTERS: 45.00
OD_AXISANGLE_DEGREES: 147
OS_K1POWER_DIOPTERS: 44.25
OD_K2POWER_DIOPTERS: 45.50
OS_AXISANGLE_DEGREES: 150
OD_K1POWER_DIOPTERS: 45.00
METHOD_AUTO_MANUAL: AUTO

## 2025-01-21 ASSESSMENT — REFRACTION_AUTOREFRACTION
OS_CYLINDER: -0.75
OD_AXIS: 078
OS_SPHERE: -1.00
OD_CYLINDER: -0.25
OD_SPHERE: -3.00
OS_AXIS: 058

## 2025-01-21 ASSESSMENT — REFRACTION_CURRENTRX
OD_CYLINDER: -1.00
OD_OVR_VA: 20/
OS_OVR_VA: 20/
OD_AXIS: 093
OD_CYLINDER: -1.00
OS_AXIS: 075
OS_AXIS: 070
OS_VPRISM_DIRECTION: SV
OS_VPRISM_DIRECTION: SV
OD_VPRISM_DIRECTION: SV
OD_CYLINDER: -1.25
OD_AXIS: 92
OS_SPHERE: -2.00
OS_VPRISM_DIRECTION: SV
OD_SPHERE: -0.75
OD_AXIS: 085
OD_CYLINDER: -1.00
OD_SPHERE: -0.75
OD_VPRISM_DIRECTION: SV
OS_CYLINDER: -1.00
OD_OVR_VA: 20/
OD_VPRISM_DIRECTION: SV
OD_SPHERE: -0.75
OS_SPHERE: -0.50
OS_CYLINDER: -0.75
OS_AXIS: 63
OS_OVR_VA: 20/
OS_VPRISM_DIRECTION: SV
OS_AXIS: 065
OS_SPHERE: -0.50
OS_OVR_VA: 20/
OD_VPRISM_DIRECTION: SV
OS_CYLINDER: -0.75
OS_SPHERE: -0.50
OD_AXIS: 080
OD_SPHERE: +2.00
OD_OVR_VA: 20/

## 2025-01-21 ASSESSMENT — REFRACTION_MANIFEST
OS_VA1: 20/25+2
OS_CYLINDER: -0.75
OD_VA1: 20/30-1
OS_CYLINDER: -0.75
OD_AXIS: 075
OS_VA1: 20/25
OS_VA2: 20/20
OD_VA1: 20/40+2
OD_AXIS: 090
OD_CYLINDER: -0.50
OS_CYLINDER: -1.00
OS_AXIS: 070
OD_ADD: +2.50
OD_VA2: 20/20(J1+)
OD_CYLINDER: -0.50
OS_VA2: 20/20(J1+)
OS_AXIS: 060
OS_ADD: +2.50
OD_VA1: 20/25+2
OS_AXIS: 070
OS_ADD: +2.25
OS_CYLINDER: -1.00
OS_AXIS: 040
OD_AXIS: 070
OD_SPHERE: -0.75
OD_SPHERE: -2.75
OS_VA1: 20/30+2
OS_VA2: 20/30(J2)+
OD_SPHERE: -1.00
OD_ADD: +2.50
OS_ADD: +2.50
OS_SPHERE: -0.75
OD_ADD: +2.25
OS_SPHERE: -1.50
OS_VA1: 20/20
OD_VA2: 20/30(J2)+
OD_SPHERE: -0.50
OD_CYLINDER: -1.25
OD_VA2: 20/20
OD_AXIS: 080
OS_SPHERE: -0.50
OS_SPHERE: -0.50
OD_CYLINDER: -1.25
OD_VA1: 20/30

## 2025-01-21 ASSESSMENT — VISUAL ACUITY
OD_BCVA: 20/40
OS_BCVA: 20/40

## 2025-01-21 ASSESSMENT — CORNEAL EDEMA CLINICAL DESCRIPTION: OD_CORNEALEDEMA: T OVER MAIN INCISION

## 2025-01-21 ASSESSMENT — CONFRONTATIONAL VISUAL FIELD TEST (CVF)
OS_FINDINGS: FULL
OD_FINDINGS: FULL

## 2025-01-28 ENCOUNTER — OFFICE (OUTPATIENT)
Facility: LOCATION | Age: 75
Setting detail: OPHTHALMOLOGY
End: 2025-01-28
Payer: MEDICARE

## 2025-01-28 ENCOUNTER — RX ONLY (RX ONLY)
Age: 75
End: 2025-01-28

## 2025-01-28 DIAGNOSIS — H25.12: ICD-10-CM

## 2025-01-28 PROBLEM — H01.001 BLEPHARITIS ;  , RIGHT LOWER LID, LEFT UPPER LID, LEFT LOWER LID, RIGHT UPPER LID: Status: ACTIVE | Noted: 2025-01-14

## 2025-01-28 PROBLEM — H01.002 BLEPHARITIS ;  , RIGHT LOWER LID, LEFT UPPER LID, LEFT LOWER LID, RIGHT UPPER LID: Status: ACTIVE | Noted: 2025-01-14

## 2025-01-28 PROBLEM — H01.004 BLEPHARITIS ;  , RIGHT LOWER LID, LEFT UPPER LID, LEFT LOWER LID, RIGHT UPPER LID: Status: ACTIVE | Noted: 2025-01-14

## 2025-01-28 PROBLEM — H01.005 BLEPHARITIS ;  , RIGHT LOWER LID, LEFT UPPER LID, LEFT LOWER LID, RIGHT UPPER LID: Status: ACTIVE | Noted: 2025-01-14

## 2025-01-28 PROCEDURE — 92136 OPHTHALMIC BIOMETRY: CPT | Mod: 26,LT | Performed by: OPHTHALMOLOGY

## 2025-01-28 ASSESSMENT — CORNEAL EDEMA CLINICAL DESCRIPTION: OD_CORNEALEDEMA: T OVER MAIN INCISION

## 2025-01-28 ASSESSMENT — CONFRONTATIONAL VISUAL FIELD TEST (CVF)
OS_FINDINGS: FULL
OD_FINDINGS: FULL

## 2025-01-28 ASSESSMENT — TONOMETRY
OS_IOP_MMHG: 15
OD_IOP_MMHG: 18

## 2025-01-30 PROBLEM — Z96.1: Status: ACTIVE | Noted: 2025-01-21

## 2025-01-30 ASSESSMENT — REFRACTION_MANIFEST
OD_ADD: +2.50
OD_ADD: +2.50
OS_VA1: 20/25+2
OD_CYLINDER: -1.25
OD_CYLINDER: -0.50
OS_CYLINDER: -0.75
OS_ADD: +2.50
OD_ADD: +2.25
OS_CYLINDER: -1.00
OD_VA1: 20/40+2
OS_CYLINDER: -1.00
OD_VA2: 20/20
OS_VA1: 20/30+2
OD_SPHERE: -2.75
OD_VA2: 20/30(J2)+
OS_AXIS: 070
OS_SPHERE: -0.75
OD_AXIS: 090
OS_SPHERE: -0.50
OD_SPHERE: -1.00
OD_VA2: 20/20(J1+)
OS_VA2: 20/20
OD_SPHERE: -0.75
OD_CYLINDER: -0.50
OS_SPHERE: -0.50
OD_SPHERE: -0.50
OS_SPHERE: -1.50
OS_VA2: 20/20(J1+)
OS_AXIS: 040
OS_AXIS: 070
OD_CYLINDER: -1.25
OS_AXIS: 060
OS_VA2: 20/30(J2)+
OS_ADD: +2.50
OD_VA1: 20/30
OD_VA1: 20/25+2
OD_AXIS: 080
OS_CYLINDER: -0.75
OD_AXIS: 075
OS_ADD: +2.25
OD_VA1: 20/30-1
OD_AXIS: 070
OS_VA1: 20/25
OS_VA1: 20/20

## 2025-01-30 ASSESSMENT — REFRACTION_CURRENTRX
OS_SPHERE: -0.50
OS_VPRISM_DIRECTION: SV
OS_SPHERE: -0.50
OD_CYLINDER: -1.00
OS_CYLINDER: -0.75
OD_CYLINDER: -1.00
OS_VPRISM_DIRECTION: SV
OD_OVR_VA: 20/
OS_CYLINDER: -1.00
OD_CYLINDER: -1.25
OD_OVR_VA: 20/
OD_VPRISM_DIRECTION: SV
OD_VPRISM_DIRECTION: SV
OS_SPHERE: -0.50
OS_CYLINDER: 0.00
OD_AXIS: 080
OS_AXIS: 63
OD_AXIS: 093
OD_OVR_VA: 20/
OS_VPRISM_DIRECTION: SV
OD_CYLINDER: -1.00
OD_VPRISM_DIRECTION: SV
OD_AXIS: 92
OS_SPHERE: -2.00
OD_SPHERE: -0.75
OS_OVR_VA: 20/
OD_SPHERE: -0.75
OS_OVR_VA: 20/
OS_AXIS: 070
OD_VPRISM_DIRECTION: SV
OS_VPRISM_DIRECTION: SV
OS_CYLINDER: -0.75
OD_SPHERE: +2.00
OD_SPHERE: -0.75
OS_OVR_VA: 20/
OD_AXIS: 085
OS_AXIS: 065
OS_AXIS: 075

## 2025-01-30 ASSESSMENT — KERATOMETRY
METHOD_AUTO_MANUAL: AUTO
OS_K2POWER_DIOPTERS: 44.75
OS_K1POWER_DIOPTERS: 44.00
OD_AXISANGLE_DEGREES: 139
OS_AXISANGLE_DEGREES: 154
OD_K1POWER_DIOPTERS: 44.50
OD_K2POWER_DIOPTERS: 45.75

## 2025-01-30 ASSESSMENT — REFRACTION_AUTOREFRACTION
OS_SPHERE: -1.00
OD_SPHERE: +0.75
OD_AXIS: 064
OD_CYLINDER: -1.00
OS_AXIS: 057
OS_CYLINDER: -0.75

## 2025-01-30 ASSESSMENT — VISUAL ACUITY
OS_BCVA: 20/50-1
OD_BCVA: 20/25-1

## 2025-02-03 ENCOUNTER — AMBULATORY SURGERY CENTER (OUTPATIENT)
Dept: URBAN - METROPOLITAN AREA SURGERY 25 | Facility: SURGERY | Age: 75
Setting detail: OPHTHALMOLOGY
End: 2025-02-03
Payer: MEDICARE

## 2025-02-03 DIAGNOSIS — H25.12: ICD-10-CM

## 2025-02-03 PROCEDURE — 66984 XCAPSL CTRC RMVL W/O ECP: CPT | Mod: 79,LT | Performed by: OPHTHALMOLOGY

## 2025-02-04 ENCOUNTER — OFFICE (OUTPATIENT)
Facility: LOCATION | Age: 75
Setting detail: OPHTHALMOLOGY
End: 2025-02-04
Payer: MEDICARE

## 2025-02-04 ENCOUNTER — RX ONLY (RX ONLY)
Age: 75
End: 2025-02-04

## 2025-02-04 DIAGNOSIS — Z96.1: ICD-10-CM

## 2025-02-04 PROCEDURE — 99024 POSTOP FOLLOW-UP VISIT: CPT | Performed by: OPHTHALMOLOGY

## 2025-02-04 ASSESSMENT — REFRACTION_CURRENTRX
OD_CYLINDER: -1.00
OD_AXIS: 92
OS_AXIS: 065
OD_VPRISM_DIRECTION: SV
OD_OVR_VA: 20/
OD_CYLINDER: -1.00
OD_VPRISM_DIRECTION: SV
OD_SPHERE: -0.75
OS_VPRISM_DIRECTION: SV
OS_VPRISM_DIRECTION: SV
OD_OVR_VA: 20/
OD_VPRISM_DIRECTION: SV
OD_AXIS: 093
OS_OVR_VA: 20/
OS_SPHERE: -2.00
OD_OVR_VA: 20/
OD_AXIS: 085
OS_CYLINDER: 0.00
OD_SPHERE: -0.75
OS_VPRISM_DIRECTION: SV
OD_SPHERE: -0.75
OS_SPHERE: -0.50
OS_AXIS: 075
OS_SPHERE: -0.50
OD_VPRISM_DIRECTION: SV
OD_CYLINDER: -1.00
OD_AXIS: 080
OS_AXIS: 070
OS_CYLINDER: -0.75
OS_OVR_VA: 20/
OS_OVR_VA: 20/
OD_CYLINDER: -1.25
OD_SPHERE: +2.00
OS_AXIS: 63
OS_VPRISM_DIRECTION: SV
OS_CYLINDER: -0.75
OS_SPHERE: -0.50
OS_CYLINDER: -1.00

## 2025-02-04 ASSESSMENT — REFRACTION_MANIFEST
OD_ADD: +2.50
OD_ADD: +2.25
OD_AXIS: 090
OS_VA1: 20/30+2
OD_SPHERE: -1.00
OD_VA1: 20/30-1
OS_AXIS: 070
OS_VA2: 20/30(J2)+
OD_SPHERE: -2.75
OD_CYLINDER: -1.25
OD_AXIS: 070
OD_VA1: 20/30
OD_SPHERE: -0.75
OS_VA1: 20/20
OD_ADD: +2.50
OS_SPHERE: -0.75
OD_VA2: 20/30(J2)+
OD_CYLINDER: -0.50
OD_CYLINDER: -1.25
OS_VA1: 20/25
OD_VA1: 20/25+2
OD_VA2: 20/20(J1+)
OS_ADD: +2.50
OS_VA2: 20/20
OS_VA1: 20/25+2
OS_ADD: +2.50
OD_CYLINDER: -0.50
OS_SPHERE: -0.50
OD_AXIS: 080
OS_SPHERE: -1.50
OD_VA1: 20/40+2
OS_VA2: 20/20(J1+)
OS_CYLINDER: -1.00
OD_AXIS: 075
OS_SPHERE: -0.50
OS_CYLINDER: -0.75
OS_ADD: +2.25
OS_CYLINDER: -1.00
OS_AXIS: 070
OS_AXIS: 060
OS_CYLINDER: -0.75
OS_AXIS: 040
OD_SPHERE: -0.50
OD_VA2: 20/20

## 2025-02-04 ASSESSMENT — KERATOMETRY
METHOD_AUTO_MANUAL: AUTO
OD_K1POWER_DIOPTERS: 44.50
OS_K2POWER_DIOPTERS: 44.75
OS_AXISANGLE_DEGREES: 154
OS_K1POWER_DIOPTERS: 44.00
OD_AXISANGLE_DEGREES: 139
OD_K2POWER_DIOPTERS: 45.75

## 2025-02-04 ASSESSMENT — REFRACTION_AUTOREFRACTION
OD_CYLINDER: -1.00
OS_CYLINDER: -0.75
OD_SPHERE: +0.75
OS_AXIS: 057
OD_AXIS: 064
OS_SPHERE: -1.00

## 2025-02-04 ASSESSMENT — CONFRONTATIONAL VISUAL FIELD TEST (CVF)
OS_FINDINGS: FULL
OD_FINDINGS: FULL

## 2025-02-04 ASSESSMENT — VISUAL ACUITY
OS_BCVA: 20/30-1
OD_BCVA: 20/20-1

## 2025-02-04 ASSESSMENT — CORNEAL EDEMA CLINICAL DESCRIPTION
OD_CORNEALEDEMA: T
OS_CORNEALEDEMA: T OVER MAIN INCISION

## 2025-02-11 ENCOUNTER — OFFICE (OUTPATIENT)
Facility: LOCATION | Age: 75
Setting detail: OPHTHALMOLOGY
End: 2025-02-11
Payer: MEDICARE

## 2025-02-11 DIAGNOSIS — Z96.1: ICD-10-CM

## 2025-02-11 DIAGNOSIS — H11.31: ICD-10-CM

## 2025-02-11 PROCEDURE — 99024 POSTOP FOLLOW-UP VISIT: CPT | Performed by: OPHTHALMOLOGY

## 2025-02-11 ASSESSMENT — REFRACTION_MANIFEST
OD_SPHERE: -1.00
OS_VA1: 20/25+2
OS_VA2: 20/30(J2)+
OD_VA2: 20/20(J1+)
OD_CYLINDER: -1.25
OS_AXIS: 060
OS_ADD: +2.50
OS_AXIS: 070
OD_SPHERE: -0.50
OD_AXIS: 075
OS_SPHERE: -0.50
OD_AXIS: 080
OS_CYLINDER: -0.75
OS_CYLINDER: -1.00
OS_CYLINDER: -0.75
OS_SPHERE: -1.50
OD_CYLINDER: -0.50
OS_VA1: 20/20
OS_VA1: 20/25
OS_CYLINDER: -1.00
OD_SPHERE: -0.75
OS_AXIS: 070
OD_VA1: 20/40+2
OS_VA1: 20/30+2
OD_CYLINDER: -0.50
OS_ADD: +2.25
OD_VA2: 20/30(J2)+
OS_VA2: 20/20(J1+)
OD_SPHERE: -2.75
OD_ADD: +2.50
OD_VA1: 20/30
OS_ADD: +2.50
OD_ADD: +2.25
OS_SPHERE: -0.50
OD_VA1: 20/30-1
OD_VA1: 20/25+2
OD_AXIS: 070
OS_SPHERE: -0.75
OD_ADD: +2.50
OD_CYLINDER: -1.25
OD_VA2: 20/20
OD_AXIS: 090
OS_VA2: 20/20
OS_AXIS: 040

## 2025-02-11 ASSESSMENT — REFRACTION_CURRENTRX
OS_OVR_VA: 20/
OD_VPRISM_DIRECTION: SV
OS_VPRISM_DIRECTION: SV
OS_OVR_VA: 20/
OD_OVR_VA: 20/
OS_AXIS: 070
OD_OVR_VA: 20/
OS_CYLINDER: -0.75
OS_VPRISM_DIRECTION: SV
OD_AXIS: 085
OD_SPHERE: -0.75
OD_SPHERE: -0.75
OS_CYLINDER: -1.00
OD_SPHERE: +2.00
OD_VPRISM_DIRECTION: SV
OS_SPHERE: -0.50
OS_VPRISM_DIRECTION: SV
OS_OVR_VA: 20/
OD_CYLINDER: -1.00
OS_AXIS: 075
OS_VPRISM_DIRECTION: SV
OD_CYLINDER: -1.00
OS_SPHERE: -0.50
OD_AXIS: 093
OD_CYLINDER: -1.00
OS_SPHERE: -0.50
OD_VPRISM_DIRECTION: SV
OS_AXIS: 065
OS_CYLINDER: 0.00
OS_SPHERE: -2.00
OD_VPRISM_DIRECTION: SV
OD_CYLINDER: -1.25
OD_OVR_VA: 20/
OD_SPHERE: -0.75
OS_CYLINDER: -0.75
OS_AXIS: 63
OD_AXIS: 080
OD_AXIS: 92

## 2025-02-11 ASSESSMENT — CORNEAL EDEMA CLINICAL DESCRIPTION
OD_CORNEALEDEMA: ABSENT
OS_CORNEALEDEMA: T OVER MAIN INCISION

## 2025-02-11 ASSESSMENT — KERATOMETRY
OS_K1POWER_DIOPTERS: 44.00
METHOD_AUTO_MANUAL: AUTO
OS_K2POWER_DIOPTERS: 44.75
OD_K2POWER_DIOPTERS: 44.50
OD_K1POWER_DIOPTERS: 44.50
OD_AXISANGLE_DEGREES: 090
OS_AXISANGLE_DEGREES: 148

## 2025-02-11 ASSESSMENT — REFRACTION_AUTOREFRACTION
OS_CYLINDER: -1.00
OS_AXIS: 067
OD_AXIS: 056
OD_CYLINDER: -0.75
OD_SPHERE: +0.50
OS_SPHERE: +0.75

## 2025-02-11 ASSESSMENT — VISUAL ACUITY
OD_BCVA: 20/20-2
OS_BCVA: 20/25-2

## 2025-02-11 ASSESSMENT — CONFRONTATIONAL VISUAL FIELD TEST (CVF)
OS_FINDINGS: FULL
OD_FINDINGS: FULL

## 2025-03-05 ENCOUNTER — OFFICE (OUTPATIENT)
Facility: LOCATION | Age: 75
Setting detail: OPHTHALMOLOGY
End: 2025-03-05
Payer: MEDICARE

## 2025-03-05 DIAGNOSIS — H26.491: ICD-10-CM

## 2025-03-05 DIAGNOSIS — Z96.1: ICD-10-CM

## 2025-03-05 PROCEDURE — 99024 POSTOP FOLLOW-UP VISIT: CPT | Performed by: OPHTHALMOLOGY

## 2025-03-05 ASSESSMENT — KERATOMETRY
OS_K2POWER_DIOPTERS: 44.75
METHOD_AUTO_MANUAL: AUTO
OD_K2POWER_DIOPTERS: 45.50
OD_AXISANGLE_DEGREES: 148
OS_K1POWER_DIOPTERS: 44.00
OD_K1POWER_DIOPTERS: 44.75
OS_AXISANGLE_DEGREES: 142

## 2025-03-05 ASSESSMENT — REFRACTION_MANIFEST
OD_CYLINDER: -0.75
OS_ADD: +2.50
OD_CYLINDER: -0.50
OS_ADD: +2.50
OD_ADD: +2.50
OD_CYLINDER: -1.25
OS_SPHERE: -0.75
OD_VA2: 20/20(J1+)
OD_ADD: +2.50
OD_AXIS: 075
OS_AXIS: 070
OS_VA1: 20/25+2
OS_SPHERE: -0.50
OS_AXIS: 065
OS_SPHERE: +0.50
OD_ADD: +2.25
OD_VA2: 20/20
OS_SPHERE: -1.50
OS_CYLINDER: -0.75
OS_VA1: 20/30+2
OD_VA1: 20/30
OD_VA2: 20/30(J2)+
OD_VA1: 20/30-1
OD_AXIS: 080
OD_ADD: +2.75
OS_VA2: 20/20
OD_SPHERE: -2.75
OD_SPHERE: -1.00
OS_VA2: 20/20(J1+)
OD_SPHERE: -0.50
OS_CYLINDER: -1.00
OD_CYLINDER: -1.25
OS_AXIS: 060
OD_SPHERE: -0.25
OS_VA1: 20/20
OD_VA1: 20/25
OD_AXIS: 090
OS_AXIS: 070
OS_CYLINDER: -1.00
OD_VA1: 20/40+2
OD_SPHERE: -0.75
OS_ADD: +2.25
OS_VA1: 20/20
OS_ADD: +2.75
OS_CYLINDER: -0.75
OD_VA1: 20/25+2
OS_CYLINDER: -0.75
OS_VA2: 20/30(J2)+
OS_VA1: 20/25
OD_AXIS: 070
OS_SPHERE: -0.50
OS_AXIS: 040
OD_CYLINDER: -0.50
OD_AXIS: 070

## 2025-03-05 ASSESSMENT — CONFRONTATIONAL VISUAL FIELD TEST (CVF)
OD_FINDINGS: FULL
OS_FINDINGS: FULL

## 2025-03-05 ASSESSMENT — REFRACTION_CURRENTRX
OS_CYLINDER: 0.00
OD_AXIS: 085
OS_AXIS: 63
OD_OVR_VA: 20/
OS_SPHERE: -2.00
OD_SPHERE: +2.00
OD_AXIS: 093
OD_SPHERE: -0.75
OS_CYLINDER: -0.75
OD_OVR_VA: 20/
OS_OVR_VA: 20/
OD_VPRISM_DIRECTION: SV
OS_SPHERE: -0.50
OS_OVR_VA: 20/
OS_AXIS: 075
OD_SPHERE: -0.75
OS_OVR_VA: 20/
OD_CYLINDER: -1.00
OD_OVR_VA: 20/
OS_SPHERE: -0.50
OD_VPRISM_DIRECTION: SV
OS_AXIS: 065
OS_AXIS: 070
OS_VPRISM_DIRECTION: SV
OS_VPRISM_DIRECTION: SV
OD_SPHERE: -0.75
OS_CYLINDER: -0.75
OS_SPHERE: -0.50
OS_VPRISM_DIRECTION: SV
OD_VPRISM_DIRECTION: SV
OD_CYLINDER: -1.00
OD_CYLINDER: -1.25
OD_CYLINDER: -1.00
OS_CYLINDER: -1.00
OD_AXIS: 080
OS_VPRISM_DIRECTION: SV
OD_AXIS: 92
OD_VPRISM_DIRECTION: SV

## 2025-03-05 ASSESSMENT — VISUAL ACUITY
OS_BCVA: 20/30-
OD_BCVA: 20/20-2

## 2025-03-05 ASSESSMENT — REFRACTION_AUTOREFRACTION
OD_CYLINDER: -0.75
OD_SPHERE: +0.25
OD_AXIS: 068
OS_SPHERE: +0.75
OS_CYLINDER: -0.75
OS_AXIS: 064

## 2025-03-05 ASSESSMENT — CORNEAL EDEMA CLINICAL DESCRIPTION
OS_CORNEALEDEMA: ABSENT
OD_CORNEALEDEMA: ABSENT

## 2025-03-10 ENCOUNTER — APPOINTMENT (OUTPATIENT)
Dept: PHARMACY | Facility: CLINIC | Age: 75
End: 2025-03-10

## 2025-04-02 ENCOUNTER — APPOINTMENT (OUTPATIENT)
Dept: OTOLARYNGOLOGY | Facility: CLINIC | Age: 75
End: 2025-04-02

## 2025-04-02 PROCEDURE — G0268 REMOVAL OF IMPACTED WAX MD: CPT

## 2025-04-02 PROCEDURE — 99213 OFFICE O/P EST LOW 20 MIN: CPT | Mod: 25

## 2025-06-17 ENCOUNTER — APPOINTMENT (OUTPATIENT)
Dept: PHARMACY | Facility: CLINIC | Age: 75
End: 2025-06-17
Payer: SELF-PAY

## 2025-06-17 PROCEDURE — V5299A: CUSTOM

## 2025-09-06 ENCOUNTER — NON-APPOINTMENT (OUTPATIENT)
Age: 75
End: 2025-09-06

## 2025-09-08 ENCOUNTER — APPOINTMENT (OUTPATIENT)
Dept: DERMATOLOGY | Facility: CLINIC | Age: 75
End: 2025-09-08
Payer: MEDICARE

## 2025-09-08 DIAGNOSIS — L30.4 ERYTHEMA INTERTRIGO: ICD-10-CM

## 2025-09-08 DIAGNOSIS — Z12.83 ENCOUNTER FOR SCREENING FOR MALIGNANT NEOPLASM OF SKIN: ICD-10-CM

## 2025-09-08 DIAGNOSIS — Z86.006 PERSONAL HISTORY OF MELANOMA IN-SITU: ICD-10-CM

## 2025-09-08 DIAGNOSIS — D22.9 MELANOCYTIC NEVI, UNSPECIFIED: ICD-10-CM

## 2025-09-08 DIAGNOSIS — L57.0 ACTINIC KERATOSIS: ICD-10-CM

## 2025-09-08 DIAGNOSIS — L82.1 OTHER SEBORRHEIC KERATOSIS: ICD-10-CM

## 2025-09-08 PROCEDURE — 17000 DESTRUCT PREMALG LESION: CPT

## 2025-09-08 PROCEDURE — 17003 DESTRUCT PREMALG LES 2-14: CPT

## 2025-09-08 PROCEDURE — 99204 OFFICE O/P NEW MOD 45 MIN: CPT | Mod: 25

## 2025-09-08 RX ORDER — NYSTATIN 100000 [USP'U]/G
100000 CREAM TOPICAL
Qty: 1 | Refills: 0 | Status: ACTIVE | COMMUNITY
Start: 2025-09-08 | End: 1900-01-01

## 2025-09-08 RX ORDER — TRIAMCINOLONE ACETONIDE 1 MG/G
0.1 OINTMENT TOPICAL
Qty: 1 | Refills: 0 | Status: ACTIVE | COMMUNITY
Start: 2025-09-08 | End: 1900-01-01